# Patient Record
Sex: MALE | Race: WHITE
[De-identification: names, ages, dates, MRNs, and addresses within clinical notes are randomized per-mention and may not be internally consistent; named-entity substitution may affect disease eponyms.]

---

## 2018-04-26 ENCOUNTER — HOSPITAL ENCOUNTER (EMERGENCY)
Dept: HOSPITAL 80 - FED | Age: 73
Discharge: HOME | End: 2018-04-26
Payer: COMMERCIAL

## 2018-04-26 VITALS — SYSTOLIC BLOOD PRESSURE: 125 MMHG | DIASTOLIC BLOOD PRESSURE: 72 MMHG

## 2018-04-26 DIAGNOSIS — E86.9: ICD-10-CM

## 2018-04-26 DIAGNOSIS — N20.0: Primary | ICD-10-CM

## 2018-04-26 LAB — PLATELET # BLD: 136 10^3/UL (ref 150–400)

## 2018-04-26 NOTE — EDPHY
HPI/HX/ROS/PE/MDM


Narrative: 


CHIEF COMPLAINT: Right-sided abdominal pain





HISTORY OF PRESENT ILLNESS:   


The patient is a 72 y/o male with a history of kidney stones complaining of 

sudden onset, sharp, right-sided abdominal pain radiating to the tip of his 

right testicle, onset at 14:00, 1.5 hours ago. When the symptoms began he also 

became sweaty. He then had a bowel movement and the pain subsided for 30 minutes

, but returned. Since arriving to [place] he has been drinking more coffee but 

has not had much water. No history of kidney injury or failure. 





No fever, chills, chest pain, shortness of breath, palpitations, vomiting, 

diarrhea, urinary complaints, headache, lightheadedness. 





REVIEW OF SYSTEMS:


Aside from elements discussed in the HPI, a comprehensive 10-point review of 

systems was reviewed and is negative.





PAST MEDICAL HISTORY: Kidney stones x 2, non-Hodgkin's Lymphoma, hernia repair 

surgery as a child  





SOCIAL HISTORY: Brother at bedside, lives in [place], retired





VITAL SIGNS: Reviewed by me


GENERAL: Well-developed, well-nourished, in no respiratory distress.


HEENT: Atraumatic. Eyes: No icterus, no injection. Mouth: moist mucous 

membranes.  No erythema or lesions. Neck: supple with no adenopathy.


LUNGS: Clear to auscultation bilaterally, no wheezes, rhonchi or rales.


CARDIAC: Regular rate and rhythm, no rubs, murmurs or gallops.


ABDOMEN: Right mid-quadrant discomfort, right lower quadrant discomfort with 

pain that radiates into the right inguinal region. Soft, nondistended, bowel 

sounds normal.


BACK:  No CVA tenderness.


EXTREMITIES: No trauma. No edema.  Range of motion is normal throughout.


NEURO: Alert and oriented,  grossly nonfocal.  


SKIN: Warm and dry, no rash.


PSYCHIATRIC: Normal mentation, no agitation.





Portions of this note were transcribed by a medical scribe.  I personally 

performed a history, physical exam, medical decision making, and confirmed 

accuracy of information the transcribed note.





ED Course: 


The patient is a 72 y/o male with a history of kidney stones presenting with 

sudden onset, sharp, right-sided abdominal pain radiating to the tip of his 

right testicle, onset at 14:00, 1.5 hours ago. On exam he has right mid-

quadrant discomfort, right lower quadrant discomfort, and pain that radiates 

into right his inguinal region. Labs and abdominopelvic CT ordered. 1L IV NS, 

100mcg IV Fentanyl, and 4mg IV Zofran administered.





1600: Spoke with radiologist, patient has a right-sided kidney stone at the 

UVJ. 30mg IV Toradol and 0.4mg PO Flomax administered.





1615: Reassessed patient and discussed imaging and laboratory findings. I have 

prescribed him Percocet, Flomax, and Zofran for his symptoms. I have advised 

him to follow up with his PCP when he returns home to [place]. Return precautions 

provided; patient is comfortable with this plan.





MDM: 


diff dx considered included kidney stone, pyelonephritis, diverticulitis, AAA 

with dissection or ruptured, appendicitis, zoster.








- Data Points


Imaging: Discussed imaging studies w/ On call Radiologist


Laboratory Results: 


 Laboratory Results





 04/26/18 15:30 





 04/26/18 15:30 








Medications Given: 


 








Discontinued Medications





Fentanyl (Sublimaze)  100 mcg IVP EDNOW ONE


   Stop: 04/26/18 15:28


   Last Admin: 04/26/18 15:37 Dose:  100 mcg


Sodium Chloride (Ns)  1,000 mls @ 0 mls/hr IV EDNOW ONE; Wide Open


   PRN Reason: Protocol


   Stop: 04/26/18 15:28


   Last Admin: 04/26/18 15:37 Dose:  1,000 mls


Ketorolac Tromethamine (Toradol)  30 mg IVP EDNOW ONE


   Stop: 04/26/18 16:05


   Last Admin: 04/26/18 16:14 Dose:  30 mg


Ondansetron HCl (Zofran)  4 mg IVP EDNOW ONE


   Stop: 04/26/18 15:28


   Last Admin: 04/26/18 15:37 Dose:  4 mg


Tamsulosin HCl (Flomax)  0.4 mg PO EDNOW ONE


   Stop: 04/26/18 16:05


   Last Admin: 04/26/18 16:14 Dose:  0.4 mg








General


Time Seen by Provider: 04/26/18 15:05


Initial Vital Signs: 


 Initial Vital Signs











Temperature (C)  36.8 C   04/26/18 14:49


 


Heart Rate  80   04/26/18 14:49


 


Respiratory Rate  18   04/26/18 14:49


 


Blood Pressure  136/86 H  04/26/18 14:49


 


O2 Sat (%)  94   04/26/18 14:49








 











O2 Delivery Mode               Room Air














Allergies/Adverse Reactions: 


 





No Known Allergies Allergy (Unverified 04/26/18 14:48)


 








Home Medications: 














 Medication  Instructions  Recorded


 


Ondansetron HCl [Zofran] 4 mg PO Q4-6PRN PRN #15 tablet 04/26/18


 


Tamsulosin HCl [Flomax] 0.4 mg PO DAILY #7 cap 04/26/18


 


oxyCODONE/APAP 5/325 [Percocet 1 tab PO TID PRN #15 tab 04/26/18





5/325 (*)]  














Departure





- Departure


Disposition: Home, Routine, Self-Care


Clinical Impression: 


 Kidney stone on right side





Condition: Good


Instructions:  Kidney Stones (ED), How to Strain Your Urine (ED)


Additional Instructions: 


Most important therapy is to drink plenty of fluids and stay well hydrated.





Take oxycodone as needed for severe pain.





Use Zofran as needed for nausea.





Take ibuprofen 600 mg every 6-8 hours as needed for moderate pain.  This will 

also help with inflammation.





Take Flomax as directed.





Followup with urology when you return to [place].





Strain urine.





Return to the emergency department if you have worsening pain, fevers, 

persistent vomiting, or other concerns.





Referrals: 


[facility],. [Clinic] - As per Instructions


Prescriptions: 


Ondansetron HCl [Zofran] 4 mg PO Q4-6PRN PRN #15 tablet


 PRN Reason: nausea


oxyCODONE/APAP 5/325 [Percocet 5/325 (*)] 1 tab PO TID PRN #15 tab


 PRN Reason: Pain


Tamsulosin HCl [Flomax] 0.4 mg PO DAILY #7 cap


Report Scribed for: Jasmin Winston


Report Scribed by: Alma Enriquez


Date of Report: 04/26/18


Time of Report: 15:17

## 2021-08-10 ENCOUNTER — APPOINTMENT (RX ONLY)
Dept: URBAN - METROPOLITAN AREA CLINIC 53 | Facility: CLINIC | Age: 76
Setting detail: DERMATOLOGY
End: 2021-08-10

## 2021-08-10 VITALS — TEMPERATURE: 97.6 F

## 2021-08-10 DIAGNOSIS — L72.8 OTHER FOLLICULAR CYSTS OF THE SKIN AND SUBCUTANEOUS TISSUE: ICD-10-CM

## 2021-08-10 DIAGNOSIS — L81.4 OTHER MELANIN HYPERPIGMENTATION: ICD-10-CM

## 2021-08-10 DIAGNOSIS — L91.8 OTHER HYPERTROPHIC DISORDERS OF THE SKIN: ICD-10-CM

## 2021-08-10 DIAGNOSIS — L82.1 OTHER SEBORRHEIC KERATOSIS: ICD-10-CM

## 2021-08-10 PROCEDURE — ? COUNSELING

## 2021-08-10 PROCEDURE — ? DEFER

## 2021-08-10 PROCEDURE — ? SUNSCREEN RECOMMENDATIONS

## 2021-08-10 PROCEDURE — 99203 OFFICE O/P NEW LOW 30 MIN: CPT

## 2021-08-10 PROCEDURE — ? FULL BODY SKIN EXAM

## 2021-08-10 ASSESSMENT — LOCATION SIMPLE DESCRIPTION DERM
LOCATION SIMPLE: LEFT AXILLARY VAULT
LOCATION SIMPLE: RIGHT CLAVICULAR SKIN
LOCATION SIMPLE: RIGHT POSTERIOR AXILLA
LOCATION SIMPLE: CHEST
LOCATION SIMPLE: LEFT SHOULDER

## 2021-08-10 ASSESSMENT — LOCATION ZONE DERM
LOCATION ZONE: AXILLAE
LOCATION ZONE: ARM
LOCATION ZONE: TRUNK

## 2021-08-10 ASSESSMENT — LOCATION DETAILED DESCRIPTION DERM
LOCATION DETAILED: LEFT ANTERIOR SHOULDER
LOCATION DETAILED: RIGHT MEDIAL SUPERIOR CHEST
LOCATION DETAILED: RIGHT POSTERIOR AXILLA
LOCATION DETAILED: RIGHT CLAVICULAR SKIN
LOCATION DETAILED: LEFT AXILLARY VAULT

## 2021-08-10 NOTE — PROCEDURE: DEFER
Introduction Text (Please End With A Colon): The following procedure was
Detail Level: Detailed
Instructions (Optional): General surgeon

## 2024-01-25 ENCOUNTER — APPOINTMENT (RX ONLY)
Dept: URBAN - METROPOLITAN AREA CLINIC 53 | Facility: CLINIC | Age: 79
Setting detail: DERMATOLOGY
End: 2024-01-25

## 2024-01-25 DIAGNOSIS — L81.4 OTHER MELANIN HYPERPIGMENTATION: ICD-10-CM | Status: STABLE

## 2024-01-25 DIAGNOSIS — L50.1 IDIOPATHIC URTICARIA: ICD-10-CM | Status: INADEQUATELY CONTROLLED

## 2024-01-25 PROBLEM — L30.9 DERMATITIS, UNSPECIFIED: Status: ACTIVE | Noted: 2024-01-25

## 2024-01-25 PROCEDURE — ? DEFER

## 2024-01-25 PROCEDURE — ? BIOPSY BY SHAVE METHOD

## 2024-01-25 PROCEDURE — ? TREATMENT REGIMEN

## 2024-01-25 PROCEDURE — 11102 TANGNTL BX SKIN SINGLE LES: CPT

## 2024-01-25 PROCEDURE — ? COUNSELING

## 2024-01-25 PROCEDURE — 99212 OFFICE O/P EST SF 10 MIN: CPT | Mod: 25

## 2024-01-25 PROCEDURE — ? PRESCRIPTION

## 2024-01-25 RX ORDER — PREDNISONE 5 MG/1
TABLET ORAL
Qty: 1 | Refills: 0 | Status: ERX | COMMUNITY
Start: 2024-01-25

## 2024-01-25 RX ORDER — TRIAMCINOLONE ACETONIDE 1 MG/G
CREAM TOPICAL
Qty: 80 | Refills: 1 | Status: ERX | COMMUNITY
Start: 2024-01-25

## 2024-01-25 RX ADMIN — TRIAMCINOLONE ACETONIDE: 1 CREAM TOPICAL at 00:00

## 2024-01-25 RX ADMIN — PREDNISONE 1: 5 TABLET ORAL at 00:00

## 2024-01-25 ASSESSMENT — LOCATION DETAILED DESCRIPTION DERM
LOCATION DETAILED: LEFT BUTTOCK
LOCATION DETAILED: RIGHT BUTTOCK
LOCATION DETAILED: LEFT ANTERIOR PROXIMAL THIGH
LOCATION DETAILED: RIGHT ANTERIOR PROXIMAL THIGH
LOCATION DETAILED: LEFT MEDIAL INFERIOR CHEST
LOCATION DETAILED: LEFT INFERIOR CENTRAL MALAR CHEEK
LOCATION DETAILED: RIGHT SUPERIOR LATERAL BUCCAL CHEEK

## 2024-01-25 ASSESSMENT — LOCATION ZONE DERM
LOCATION ZONE: TRUNK
LOCATION ZONE: LEG
LOCATION ZONE: FACE

## 2024-01-25 ASSESSMENT — LOCATION SIMPLE DESCRIPTION DERM
LOCATION SIMPLE: RIGHT CHEEK
LOCATION SIMPLE: RIGHT BUTTOCK
LOCATION SIMPLE: CHEST
LOCATION SIMPLE: LEFT CHEEK
LOCATION SIMPLE: RIGHT THIGH
LOCATION SIMPLE: LEFT THIGH
LOCATION SIMPLE: LEFT BUTTOCK

## 2024-01-25 NOTE — PROCEDURE: TREATMENT REGIMEN
Initiate Treatment: Loratadine AM, benadryl PM
Detail Level: Zone
Plan: Pt to make appt with Allergist, Dr Miguel or Dr Werner

## 2024-01-25 NOTE — PROCEDURE: DEFER
Size Of Lesion In Cm (Optional): 0
Procedure To Be Performed At Next Visit: Full Skin Check
Detail Level: Detailed
Introduction Text (Please End With A Colon): The following procedure was deferred:

## 2024-01-25 NOTE — PROCEDURE: BIOPSY BY SHAVE METHOD
Detail Level: Detailed
Depth Of Biopsy: dermis
Was A Bandage Applied: Yes
Size Of Lesion In Cm: 0
Biopsy Type: H and E
Biopsy Method: Dermablade
Anesthesia Type: 1% lidocaine with epinephrine
Anesthesia Volume In Cc: 0.5
Hemostasis: Aluminum Chloride and Electrocautery
Wound Care: Aquaphor
Dressing: bandage
Destruction After The Procedure: No
Type Of Destruction Used: Curettage
Curettage Text: The wound bed was treated with curettage after the biopsy was performed.
Cryotherapy Text: The wound bed was treated with cryotherapy after the biopsy was performed.
Electrodesiccation Text: The wound bed was treated with electrodesiccation after the biopsy was performed.
Electrodesiccation And Curettage Text: The wound bed was treated with electrodesiccation and curettage after the biopsy was performed.
Silver Nitrate Text: The wound bed was treated with silver nitrate after the biopsy was performed.
Lab: 6
Lab Facility: [id number]
Consent: Written consent was obtained and risks were reviewed including but not limited to scarring, infection, bleeding, scabbing, incomplete removal, nerve damage and allergy to anesthesia.
Post-Care Instructions: I reviewed with the patient in detail post-care instructions. Patient is to keep the biopsy site dry overnight, and then apply bacitracin twice daily until healed. Patient may apply hydrogen peroxide soaks to remove any crusting.
Notification Instructions: Patient will be notified of biopsy results. However, patient instructed to call the office if not contacted within 2 weeks.
Billing Type: Third-Party Bill
Information: Selecting Yes will display possible errors in your note based on the variables you have selected. This validation is only offered as a suggestion for you. PLEASE NOTE THAT THE VALIDATION TEXT WILL BE REMOVED WHEN YOU FINALIZE YOUR NOTE. IF YOU WANT TO FAX A PRELIMINARY NOTE YOU WILL NEED TO TOGGLE THIS TO 'NO' IF YOU DO NOT WANT IT IN YOUR FAXED NOTE.

## 2024-08-22 ENCOUNTER — APPOINTMENT (RX ONLY)
Dept: URBAN - METROPOLITAN AREA CLINIC 53 | Facility: CLINIC | Age: 79
Setting detail: DERMATOLOGY
End: 2024-08-22

## 2024-08-22 DIAGNOSIS — D22 MELANOCYTIC NEVI: ICD-10-CM | Status: STABLE

## 2024-08-22 DIAGNOSIS — D18.0 HEMANGIOMA: ICD-10-CM | Status: STABLE

## 2024-08-22 DIAGNOSIS — L57.0 ACTINIC KERATOSIS: ICD-10-CM | Status: INADEQUATELY CONTROLLED

## 2024-08-22 DIAGNOSIS — L57.8 OTHER SKIN CHANGES DUE TO CHRONIC EXPOSURE TO NONIONIZING RADIATION: ICD-10-CM | Status: STABLE

## 2024-08-22 DIAGNOSIS — L82.0 INFLAMED SEBORRHEIC KERATOSIS: ICD-10-CM

## 2024-08-22 DIAGNOSIS — L82.1 OTHER SEBORRHEIC KERATOSIS: ICD-10-CM | Status: STABLE

## 2024-08-22 DIAGNOSIS — D69.2 OTHER NONTHROMBOCYTOPENIC PURPURA: ICD-10-CM

## 2024-08-22 DIAGNOSIS — L81.4 OTHER MELANIN HYPERPIGMENTATION: ICD-10-CM | Status: STABLE

## 2024-08-22 PROBLEM — D18.01 HEMANGIOMA OF SKIN AND SUBCUTANEOUS TISSUE: Status: ACTIVE | Noted: 2024-08-22

## 2024-08-22 PROBLEM — D22.5 MELANOCYTIC NEVI OF TRUNK: Status: ACTIVE | Noted: 2024-08-22

## 2024-08-22 PROCEDURE — 17000 DESTRUCT PREMALG LESION: CPT | Mod: 59

## 2024-08-22 PROCEDURE — 99213 OFFICE O/P EST LOW 20 MIN: CPT | Mod: 25

## 2024-08-22 PROCEDURE — ? COUNSELING

## 2024-08-22 PROCEDURE — ? LIQUID NITROGEN

## 2024-08-22 PROCEDURE — 17003 DESTRUCT PREMALG LES 2-14: CPT | Mod: 59

## 2024-08-22 PROCEDURE — ? TREATMENT REGIMEN

## 2024-08-22 PROCEDURE — 17110 DESTRUCTION B9 LES UP TO 14: CPT

## 2024-08-22 ASSESSMENT — LOCATION ZONE DERM
LOCATION ZONE: ARM
LOCATION ZONE: TRUNK
LOCATION ZONE: HAND
LOCATION ZONE: FACE
LOCATION ZONE: LEG

## 2024-08-22 ASSESSMENT — LOCATION SIMPLE DESCRIPTION DERM
LOCATION SIMPLE: RIGHT PRETIBIAL REGION
LOCATION SIMPLE: LEFT HAND
LOCATION SIMPLE: LEFT SHOULDER
LOCATION SIMPLE: CHEST
LOCATION SIMPLE: RIGHT HAND
LOCATION SIMPLE: LEFT UPPER BACK
LOCATION SIMPLE: LEFT FOREHEAD
LOCATION SIMPLE: LEFT LOWER BACK
LOCATION SIMPLE: RIGHT FOREHEAD
LOCATION SIMPLE: LEFT FOREARM
LOCATION SIMPLE: ABDOMEN
LOCATION SIMPLE: LEFT CHEEK
LOCATION SIMPLE: RIGHT SHOULDER
LOCATION SIMPLE: LEFT PRETIBIAL REGION
LOCATION SIMPLE: UPPER BACK

## 2024-08-22 ASSESSMENT — LOCATION DETAILED DESCRIPTION DERM
LOCATION DETAILED: LEFT DISTAL DORSAL FOREARM
LOCATION DETAILED: LEFT SUPERIOR LATERAL BUCCAL CHEEK
LOCATION DETAILED: SUPERIOR THORACIC SPINE
LOCATION DETAILED: LEFT RADIAL DORSAL HAND
LOCATION DETAILED: RIGHT LATERAL SUPERIOR CHEST
LOCATION DETAILED: LEFT SUPERIOR FOREHEAD
LOCATION DETAILED: XIPHOID
LOCATION DETAILED: RIGHT LATERAL FOREHEAD
LOCATION DETAILED: RIGHT LATERAL PROXIMAL PRETIBIAL REGION
LOCATION DETAILED: LEFT INFERIOR UPPER BACK
LOCATION DETAILED: LEFT SUPERIOR MEDIAL MIDBACK
LOCATION DETAILED: LEFT DISTAL PRETIBIAL REGION
LOCATION DETAILED: EPIGASTRIC SKIN
LOCATION DETAILED: RIGHT POSTERIOR SHOULDER
LOCATION DETAILED: RIGHT RIB CAGE
LOCATION DETAILED: RIGHT ULNAR DORSAL HAND
LOCATION DETAILED: LEFT POSTERIOR SHOULDER
LOCATION DETAILED: RIGHT DISTAL PRETIBIAL REGION

## 2024-08-22 NOTE — PROCEDURE: LIQUID NITROGEN
Post-Care Instructions: I reviewed with the patient in detail post-care instructions. Patient is to wear sunprotection, and avoid picking at any of the treated lesions. Pt may apply Vaseline to crusted or scabbing areas.
Duration Of Freeze Thaw-Cycle (Seconds): 0
Render Note In Bullet Format When Appropriate: No
Show Applicator Variable?: Yes
Consent: The patient's consent was obtained including but not limited to risks of crusting, scabbing, blistering, scarring, darker or lighter pigmentary change, recurrence, incomplete removal and infection.
Detail Level: Simple
Pared With?: Dermablade
Spray Paint Text: The liquid nitrogen was applied to the skin utilizing a spray paint frosting technique.
Medical Necessity Clause: This procedure was medically necessary because the lesions that were treated were:
Number Of Freeze-Thaw Cycles: 3 freeze-thaw cycles
Medical Necessity Information: It is in your best interest to select a reason for this procedure from the list below. All of these items fulfill various CMS LCD requirements except the new and changing color options.

## 2024-08-22 NOTE — PROCEDURE: COUNSELING
Sunscreen Recommendations: SPF 30 or higher
Detail Level: Detailed
Detail Level: Zone
Detail Level: Generalized
Detail Level: Simple